# Patient Record
Sex: FEMALE | Race: BLACK OR AFRICAN AMERICAN | URBAN - METROPOLITAN AREA
[De-identification: names, ages, dates, MRNs, and addresses within clinical notes are randomized per-mention and may not be internally consistent; named-entity substitution may affect disease eponyms.]

---

## 2019-02-22 ENCOUNTER — HISTORICAL (OUTPATIENT)
Dept: ADMINISTRATIVE | Facility: HOSPITAL | Age: 34
End: 2019-02-22

## 2019-02-22 LAB
ABS NEUT (OLG): 4.22 X10(3)/MCL (ref 2.1–9.2)
ALBUMIN SERPL-MCNC: 4 GM/DL (ref 3.4–5)
ALBUMIN/GLOB SERPL: 1.1 RATIO (ref 1.1–2)
ALP SERPL-CCNC: 78 UNIT/L (ref 38–126)
ALT SERPL-CCNC: 27 UNIT/L (ref 12–78)
APPEARANCE, UA: CLEAR
AST SERPL-CCNC: 13 UNIT/L (ref 15–37)
BACTERIA #/AREA URNS AUTO: ABNORMAL /HPF
BASOPHILS # BLD AUTO: 0 X10(3)/MCL (ref 0–0.2)
BASOPHILS NFR BLD AUTO: 1 %
BILIRUB SERPL-MCNC: 0.7 MG/DL (ref 0.2–1)
BILIRUB UR QL STRIP: NEGATIVE
BILIRUBIN DIRECT+TOT PNL SERPL-MCNC: 0.1 MG/DL (ref 0–0.5)
BILIRUBIN DIRECT+TOT PNL SERPL-MCNC: 0.6 MG/DL (ref 0–0.8)
BUN SERPL-MCNC: 11 MG/DL (ref 7–18)
CALCIUM SERPL-MCNC: 10.6 MG/DL (ref 8.5–10.1)
CHLORIDE SERPL-SCNC: 110 MMOL/L (ref 98–107)
CHOLEST SERPL-MCNC: 167 MG/DL (ref 0–200)
CHOLEST/HDLC SERPL: 4.9 {RATIO} (ref 0–4)
CO2 SERPL-SCNC: 23 MMOL/L (ref 21–32)
COLOR UR: YELLOW
CREAT SERPL-MCNC: 1.25 MG/DL (ref 0.55–1.02)
EOSINOPHIL # BLD AUTO: 0 X10(3)/MCL (ref 0–0.9)
EOSINOPHIL NFR BLD AUTO: 1 %
ERYTHROCYTE [DISTWIDTH] IN BLOOD BY AUTOMATED COUNT: 14.6 % (ref 11.5–17)
GLOBULIN SER-MCNC: 3.6 GM/DL (ref 2.4–3.5)
GLUCOSE (UA): NEGATIVE
GLUCOSE SERPL-MCNC: 97 MG/DL (ref 74–106)
HCT VFR BLD AUTO: 37.6 % (ref 37–47)
HDLC SERPL-MCNC: 34 MG/DL (ref 35–60)
HGB BLD-MCNC: 12.2 GM/DL (ref 12–16)
HGB UR QL STRIP: NEGATIVE
KETONES UR QL STRIP: ABNORMAL
LDLC SERPL CALC-MCNC: 114 MG/DL (ref 0–129)
LEUKOCYTE ESTERASE UR QL STRIP: ABNORMAL
LYMPHOCYTES # BLD AUTO: 2.1 X10(3)/MCL (ref 0.6–4.6)
LYMPHOCYTES NFR BLD AUTO: 31 %
MCH RBC QN AUTO: 27.9 PG (ref 27–31)
MCHC RBC AUTO-ENTMCNC: 32.4 GM/DL (ref 33–36)
MCV RBC AUTO: 85.8 FL (ref 80–94)
MONOCYTES # BLD AUTO: 0.4 X10(3)/MCL (ref 0.1–1.3)
MONOCYTES NFR BLD AUTO: 6 %
NEUTROPHILS # BLD AUTO: 4.22 X10(3)/MCL (ref 2.1–9.2)
NEUTROPHILS NFR BLD AUTO: 62 %
NITRITE UR QL STRIP.AUTO: NEGATIVE
PH UR STRIP: 6 [PH] (ref 5–9)
PLATELET # BLD AUTO: 280 X10(3)/MCL (ref 130–400)
PMV BLD AUTO: 12.4 FL (ref 9.4–12.4)
POTASSIUM SERPL-SCNC: 3.7 MMOL/L (ref 3.5–5.1)
PROT SERPL-MCNC: 7.6 GM/DL (ref 6.4–8.2)
PROT UR QL STRIP: NEGATIVE
RBC # BLD AUTO: 4.38 X10(6)/MCL (ref 4.2–5.4)
RBC #/AREA URNS HPF: ABNORMAL /[HPF]
SODIUM SERPL-SCNC: 140 MMOL/L (ref 136–145)
SP GR UR STRIP: 1.02 (ref 1–1.03)
SQUAMOUS EPITHELIAL, UA: ABNORMAL
TRIGL SERPL-MCNC: 93 MG/DL (ref 30–150)
UROBILINOGEN UR STRIP-ACNC: 0.2
VLDLC SERPL CALC-MCNC: 19 MG/DL
WBC # SPEC AUTO: 6.8 X10(3)/MCL (ref 4.5–11.5)
WBC #/AREA URNS AUTO: ABNORMAL /HPF (ref 0–3)

## 2019-03-25 ENCOUNTER — HOSPITAL ENCOUNTER (OUTPATIENT)
Dept: MEDSURG UNIT | Facility: HOSPITAL | Age: 34
End: 2019-03-26
Attending: INTERNAL MEDICINE | Admitting: INTERNAL MEDICINE

## 2019-03-25 LAB
ABS NEUT (OLG): 10.56 X10(3)/MCL (ref 2.1–9.2)
ALBUMIN SERPL-MCNC: 2.7 GM/DL (ref 3.4–5)
ALBUMIN/GLOB SERPL: 0.7 RATIO (ref 1.1–2)
ALP SERPL-CCNC: 72 UNIT/L (ref 38–126)
ALT SERPL-CCNC: 49 UNIT/L (ref 12–78)
APPEARANCE, UA: CLEAR
APTT PPP: 36.2 SECOND(S) (ref 24.2–33.9)
AST SERPL-CCNC: 26 UNIT/L (ref 15–37)
B-HCG SERPL QL: NEGATIVE
BACTERIA SPEC CULT: NORMAL /HPF
BASOPHILS # BLD AUTO: 0 X10(3)/MCL (ref 0–0.2)
BASOPHILS NFR BLD AUTO: 0 %
BILIRUB SERPL-MCNC: 0.6 MG/DL (ref 0.2–1)
BILIRUB UR QL STRIP: NEGATIVE
BILIRUBIN DIRECT+TOT PNL SERPL-MCNC: 0.1 MG/DL (ref 0–0.5)
BILIRUBIN DIRECT+TOT PNL SERPL-MCNC: 0.5 MG/DL (ref 0–0.8)
BUN SERPL-MCNC: 9 MG/DL (ref 7–18)
CALCIUM SERPL-MCNC: 9.6 MG/DL (ref 8.5–10.1)
CHLORIDE SERPL-SCNC: 103 MMOL/L (ref 98–107)
CO2 SERPL-SCNC: 30 MMOL/L (ref 21–32)
COLOR UR: YELLOW
CREAT SERPL-MCNC: 1.12 MG/DL (ref 0.55–1.02)
EOSINOPHIL # BLD AUTO: 0.3 X10(3)/MCL (ref 0–0.9)
EOSINOPHIL NFR BLD AUTO: 2 %
ERYTHROCYTE [DISTWIDTH] IN BLOOD BY AUTOMATED COUNT: 15 % (ref 11.5–17)
FERRITIN SERPL-MCNC: 129.1 NG/ML (ref 8–388)
FOLATE SERPL-MCNC: 32 NG/ML (ref 3.1–17.5)
GLOBULIN SER-MCNC: 4 GM/DL (ref 2.4–3.5)
GLUCOSE (UA): NEGATIVE
GLUCOSE SERPL-MCNC: 104 MG/DL (ref 74–106)
HCT VFR BLD AUTO: 24.3 % (ref 37–47)
HGB BLD-MCNC: 7.6 GM/DL (ref 12–16)
HGB UR QL STRIP: NEGATIVE
INR PPP: 1.1 (ref 0–1.3)
IRON SATN MFR SERPL: 18.8 % (ref 20–50)
IRON SERPL-MCNC: 28 MCG/DL (ref 50–175)
KETONES UR QL STRIP: NEGATIVE
LEUKOCYTE ESTERASE UR QL STRIP: NEGATIVE
LYMPHOCYTES # BLD AUTO: 2 X10(3)/MCL (ref 0.6–4.6)
LYMPHOCYTES NFR BLD AUTO: 15 %
MCH RBC QN AUTO: 27.3 PG (ref 27–31)
MCHC RBC AUTO-ENTMCNC: 31.3 GM/DL (ref 33–36)
MCV RBC AUTO: 87.4 FL (ref 80–94)
MONOCYTES # BLD AUTO: 0.5 X10(3)/MCL (ref 0.1–1.3)
MONOCYTES NFR BLD AUTO: 4 %
NEUTROPHILS # BLD AUTO: 10.56 X10(3)/MCL (ref 2.1–9.2)
NEUTROPHILS NFR BLD AUTO: 78 %
NITRITE UR QL STRIP: NEGATIVE
NRBC BLD AUTO-RTO: 0.4 % (ref 0–0.2)
PH UR STRIP: 6.5 [PH] (ref 5–9)
PLATELET # BLD AUTO: 306 X10(3)/MCL (ref 130–400)
PMV BLD AUTO: 10.3 FL (ref 9.4–12.4)
POTASSIUM SERPL-SCNC: 3.4 MMOL/L (ref 3.5–5.1)
PROT SERPL-MCNC: 6.7 GM/DL (ref 6.4–8.2)
PROT UR QL STRIP: NEGATIVE
PROTHROMBIN TIME: 14.8 SECOND(S) (ref 12–14)
RBC # BLD AUTO: 2.78 X10(6)/MCL (ref 4.2–5.4)
RBC #/AREA URNS HPF: NORMAL /[HPF]
SODIUM SERPL-SCNC: 138 MMOL/L (ref 136–145)
SP GR UR STRIP: 1.01 (ref 1–1.03)
SQUAMOUS EPITHELIAL, UA: NORMAL
TIBC SERPL-MCNC: 149 MCG/DL (ref 250–450)
TRANSFERRIN SERPL-MCNC: 119 MG/DL (ref 200–360)
UROBILINOGEN UR STRIP-ACNC: 1
VIT B12 SERPL-MCNC: 1662 PG/ML (ref 193–986)
WBC # SPEC AUTO: 13.5 X10(3)/MCL (ref 4.5–11.5)
WBC #/AREA URNS HPF: NORMAL /[HPF]

## 2019-03-26 LAB
CROSSMATCH INTERPRETATION: NORMAL
GROUP & RH: NORMAL
HCT VFR BLD AUTO: 32.5 % (ref 37–47)
HGB BLD-MCNC: 10.2 GM/DL (ref 12–16)
PRODUCT READY: NORMAL
TRANSFUSION ORDER: NORMAL

## 2022-04-30 NOTE — ED PROVIDER NOTES
Patient:   Marlee Jensen            MRN: [id number]            FIN: [id number]               Age:   33 years     Sex:  Female     :  1985   Associated Diagnoses:   Anemia   Author:   Janusz Corea      Basic Information   Time seen: Date & time 3/25/2019 19:37:00.   History source: Patient.   Arrival mode: Private vehicle, wheelchair.   History limitation: None.   Additional information: Chief Complaint from Nursing Triage Note : Chief Complaint   3/25/2019 19:37 CDT      Chief Complaint           c/o blood clot in left calf leg states that  flew into day and was told that she needs to follow up after her flight she is currently on thinners, had sx on the  she desinse any chest pain or SOB  .      History of Present Illness   The patient presents with lower extremity pain, lower extremity swelling and SOrT note:  Female nonsmoker using birthcontrol implant now post op day 10 from [facility] states developing LLE edema pain and + US for DVT started on Lovenox and travelled to  from [place] presents to ER for follow up reevalaution.  BOB SCHULTE.  The onset was 1  days ago.  The course/duration of symptoms is constant.  Type of injury: none.  Location: Left calf. The character of symptoms is pain and swelling.  The degree at present is moderate.  The exacerbating factor is none.  The relieving factor is none.  Risk factors consist of recent surgery and recent hospitalization.  Prior episodes: none.  Therapy today: none.  Associated symptoms: chest pain and shortness of breath.        Review of Systems   Constitutional symptoms:  No fever, no chills.    Respiratory symptoms:  Shortness of breath, No cough,    Cardiovascular symptoms:  Chest pain, No palpitations,    Gastrointestinal symptoms:  No vomiting, no diarrhea.    Musculoskeletal symptoms:  No back pain,    Neurologic symptoms:  No altered level of consciousness, no weakness.              Additional review  of systems information: All other systems reviewed and otherwise negative.      Health Status   Allergies:    Allergic Reactions (Selected)  No Known Allergies.   Medications:  (Selected)   Prescriptions  Prescribed  ibuprofen 800 mg oral tablet: 800 mg = 1 tab(s), Oral, q8hr, PRN PRN pain, moderate, # 30 tab(s), 0 Refill(s), Pharmacy: [id number], per nurse's notes.   Immunizations: Per nurse's notes.   Menstrual history: Per nurse's notes.      Past Medical/ Family/ Social History   Medical history:    No active or resolved past medical history items have been selected or recorded., Reviewed as documented in chart.   Surgical history:    No active procedure history items have been selected or recorded., Reviewed as documented in chart, abdominal plastic surgery .   Family history:    No family history items have been selected or recorded., Reviewed as documented in chart.   Social history: Reviewed as documented in chart.   Problem list: Per nurse's notes.      Physical Examination               Vital Signs   Vital Signs   3/25/2019 19:37 CDT      Temperature Oral          36.7 DegC                             Temperature Oral (calculated)             98.06 DegF                             Peripheral Pulse Rate     124 bpm  HI                             Respiratory Rate          18 br/min                             Systolic Blood Pressure   98 mmHg                             Diastolic Blood Pressure  73 mmHg  .   General:  Alert, no acute distress, well hydrated, Skin: Normal for ethnicity.    Skin:  Warm, dry, pink, intact.    Head:  Normocephalic.   Neck:  Supple, no tenderness, full range of motion.    Eye:  Pupils are equal, round and reactive to light, extraocular movements are intact, normal conjunctiva.    Ears, nose, mouth and throat:  Oral mucosa moist.   Cardiovascular:  No murmur, Normal peripheral perfusion, Tachycardia, S1, S2.    Respiratory:  Lungs are clear to auscultation, breath  sounds are equal, Respirations: not tachypneic, not labored, not shallow, Retractions: None.    Chest wall:  No tenderness.   Back:  Normal range of motion, Normal alignment, No costovertebral angle tenderness,    Musculoskeletal:  Normal ROM, normal strength, no swelling, no deformity, Lower extremity: Left, calf, tenderness, swelling.    Gastrointestinal:  Soft, Nontender, Non distended, Normal bowel sounds, abdominal incisions , refused occult stool .    Neurological:  Alert and oriented to person, place, time, and situation, No focal neurological deficit observed, normal sensory observed, normal motor observed, normal speech observed, normal coordination observed, Gait: Normal.    Psychiatric:  Cooperative, appropriate mood & affect, normal judgment.       Medical Decision Making   Documents reviewed:  Emergency department nurses' notes.   Orders  Launch Orders   Laboratory:  INR - Protime (Order): Stat collect, 3/25/2019 19:41 CDT, Blood, Lab Collect, Print Label By Order Location, 3/25/2019 19:41 CDT  PTT (Order): Stat collect, 3/25/2019 19:41 CDT, Blood, Lab Collect, Print Label By Order Location, 3/25/2019 19:41 CDT  Beta hCG Qualitative (Order): Stat collect, 3/25/2019 19:41 CDT, Blood, Lab Collect, Print Label By Order Location, 3/25/2019 19:41 CDT  CMP (Order): Stat collect, 3/25/2019 19:39 CDT, Blood, Lab Collect, Print Label By Order Location, 3/25/2019 19:39 CDT  CBC w/ Auto Diff (Order): Now collect, 3/25/2019 19:39 CDT, Blood, Lab Collect, Print Label By Order Location, 3/25/2019 19:39 CDT  Patient Care:  IV Fluids (Order)  Saline Lock Insert (Order): 3/25/2019 19:40 CDT  Pharmacy:  IVF Normal Saline NS Bolus 1000ml (Order): 1,000 mL, 1,000 mL, IV, 1,000 mL, start date 3/25/2019 19:42 CDT  Cardiology:  US NIVA Venous Lower Ext Left (Order): 3/25/2019 19:40 CDT  EKG Adult 12 Lead (Order): 3/25/2019 19:39 CDT, Stat, Once, 3/25/2019 19:39 CDT, -1, -1, 3/25/2019 19:39 CDT, Launch Orders   Radiology:  CT  Chest Lungs W Contrast (Order): Stat, 3/25/2019 20:43 CDT, Pulmonary Embolism, None, Stretcher, Rad Type, Schedule this test  , Launch Orders   Laboratory:  Type and Crossmatch 1st order (Order): 3/25/2019 22:25 CDT, Routine collect, Blood, Lab Collect, Leukopoor RBC, Surgery, 2, 3/25/2019, Print Label By Order Location  Type and Rh (Order Processing)  Antibody Screen for transfusion  (Indirect Elmer) (Order Processing)  Xmatch (Order Processing)  Red Blood Cells (Order Processing)  , Launch Orders   Laboratory:  Iron Profile (Order): Stat collect, 3/25/2019 23:33 CDT, Blood, Lab Collect, Print Label By Order Location, 3/25/2019 23:33 CDT  .       Impression and Plan   Diagnosis   Anemia (LRT58-DR D64.9)   Plan   Condition: Stable.    Disposition: Admit time  3/25/2019 23:33:00, Admit to Inpatient Unit.    Counseled: Patient, Regarding diagnosis, Regarding diagnostic results, Regarding treatment plan, Patient indicated understanding of instructions.    Orders: Launch Orders   Admit/Transfer/Discharge:  Admit as Inpatient (Order): 3/25/2019 23:33 CDT, Medical Unit Maurizio GUPTA, Richie BELLO, No  .       Addendum      Teaching-Supervisory Addendum-Brief   I participated in the following activities of this patients care: the medical history, medical decision making.   I personally performed: supervision of the patient's care, the medical decision making.   The case was discussed with: the nurse practitioner.   Evaluation and management service: I agree with the evaluation and management decisions made in this patient's care.   Results interpretation: I agree with the study interpretation in this patient's care.   Notes: I had face-to-face time with this patient, and I discussed the MDM and reviewed the results with the NP.     Briefly, this is a 34 y/o F 10 days s/p cosmetic surgery int he [place] who presented for evaluation of lower extremity swelling as she was diagnosed with a DVT prior to traveling back  to the [place]. She was started on lovenox prior to leaving [place]. ED work up w/o acute DVT and CT thorax w/o PE. Labs notable for significant anemia - will not allow for SILVIA to r/o GI bleeding, but denies hematochezia or melena. Will admit for transfusion, reassessment of labs in AM.     Kayce Black MD.

## 2022-04-30 NOTE — H&P
Patient:   Marlee Jensen            MRN: [id number]            FIN: [id number]               Age:   33 years     Sex:  Female     :  1985   Associated Diagnoses:   None   Author:   Richie Steven MD      Basic Information   Time Seen:  Date & Time 3/26/2019 01:13:00.    Source of history:  Self.    Referral source:  Emergency department.    History limitation:  None.    Advance directive:  Full code.       Chief Complaint   LLE swelling      History of Present Illness   Patient is a 33-year-old female presented to the ER complaining of left swelling.  She reported that she underwent an abdominoplasty approximately 2 weeks ago in the [place], and was sent home with one indwelling abdominal drain that is being managed by her plastic surgeon in [place].  She reported during the perioperative period in the [facility] she was told she may have had a very small DVT in her lower extremity, but she was only placed on daily injection of 40 mg of Lovenox for DVT prophylaxis.  When she noted the swelling this evening she was concerned that maybe her clot had come back so presented to the ER, but on arrival on ultrasound overload which showed no evidence of any DVTs.  She did however laboratory work have significant anemia with a hemoglobin tonight of 7.6, prior month ago was above 12.  She reported during the postoperative period they reported her hemoglobin did drop to about 9.4, and that she has had significant anemia in the past secondary to iron deficiency and is supposed to be taking omental on but this makes her constipated.  She is being admitted to the hospitalist service for blood transfusion, and he continued on her prophylactic Lovenox.      Review of Systems   Except as documented, all other systems reviewed and negative      Health Status   Allergies:    Allergic Reactions (Selected)  No Known Allergies,    Allergies (1) Active Reaction  No Known Allergies None  Documented   Current medications:  (Selected)   Inpatient Medications  Ordered  IVF Normal Saline NS Bolus 1000ml 1,000 mL: 1,000 mL, 1,000 mL, IV, 1,000 mL, start date 03/25/19 19:42:00 CDT  Lovenox: 40 mg, form: Injection, Subcutaneous, Daily, first dose 03/27/19 9:00:00 CDT  Protonix: 40 mg, Oral, Daily, first dose 03/26/19 6:00:00 CDT  Prescriptions  Prescribed  ibuprofen 800 mg oral tablet: 800 mg = 1 tab(s), Oral, q8hr, PRN PRN pain, moderate, # 30 tab(s), 0 Refill(s), Pharmacy: [id number]  Documented Medications  Documented  Lovenox 40 mg/0.4 mL subcutaneous solution: 40 mg = 0.4 mL, Subcutaneous, Daily, # 2.8 mL, 0 Refill(s)      Histories     Past Medical History:Morbid obesity and iron deficiency anemia   Past Surgical History:Abdominoplasty 2 weeks ago   Family History: Negative   Social History:  No alcohol, tobacco or illicit drug use.       Physical Examination      Vital Signs (last 24 hrs)_____  Last Charted___________  Temp Oral     36.7 DegC  (MAR 25 19:37)  Heart Rate Peripheral   H 110bpm  (MAR 26 00:55)  Resp Rate         18 br/min  (MAR 26 00:55)  SBP      134 mmHg  (MAR 26 00:55)  DBP      75 mmHg  (MAR 26 00:55)  SpO2      100 %  (MAR 26 00:55)   General:  Alert and oriented, No acute distress.    Cognition and Speech:  Oriented, Speech clear and coherent.    HENT:  Normocephalic, Normal hearing, Oral mucosa is moist.    Eye:  Pupils are equal, round and reactive to light, Normal conjunctiva.    Neck:  Supple, No carotid bruit, No jugular venous distention.    Respiratory:  Lungs are clear to auscultation, Respirations are non-labored, Breath sounds are equal.    Cardiovascular:  Normal rate, Regular rhythm, No murmur, No edema.    Gastrointestinal:  Soft, Non-tender, Non-distended, Normal bowel sounds, Abdominal incisional scar is healing well, with no erythema or tenderness. there is a drain in place draining serous fluid.  .    Integumentary:  Warm, Dry.    Musculoskeletal:   Normal strength, No tenderness, No swelling.    Neurologic:  Alert, Oriented, Normal sensory, No focal deficits.    Psychiatric:  Cooperative, Appropriate mood & affect, Normal judgment.       Review / Management   Laboratory Results   Today's Lab Results : PowerNote Discrete Results   3/25/2019 22:02 CDT      UA Appear                 CLEAR                             UA Color                  YELLOW                             UA Spec Grav              1.006                             UA Bili                   Negative                             UA pH                     6.5                             UA Urobilinogen           1.0                             UA Blood                  Negative                             UA Glucose                Negative                             UA Ketones                Negative                             UA Protein                Negative                             UA Nitrite                Negative                             UA Leuk Est               Negative                             UA WBC                    NONE SEEN                             UA RBC                    NONE SEEN                             UA Bacteria               NONE SEEN /HPF                             UA Squam Epithelial       NONE SEEN    3/25/2019 20:42 CDT      WBC                       13.5 x10(3)/mcL  HI                             RBC                       2.78 x10(6)/mcL  LOW                             Hgb                       7.6 gm/dL  LOW                             Hct                       24.3 %  LOW                             Platelet                  306 x10(3)/mcL                             MCV                       87.4 fL                             MCH                       27.3 pg                             MCHC                      31.3 gm/dL  LOW                             RDW                       15.0 %                             MPV                        10.3 fL                             Abs Neut                  10.56 x10(3)/mcL  HI                             Neutro Auto               78 %  NA                             Lymph Auto                15 %  NA                             Mono Auto                 4 %  NA                             Eos Auto                  2 %  NA                             Abs Eos                   0.3 x10(3)/mcL                             Basophil Auto             0 %  NA                             Abs Neutro                10.56 x10(3)/mcL  HI                             Abs Lymph                 2.0 x10(3)/mcL                             Abs Mono                  0.5 x10(3)/mcL                             Abs Baso                  0.0 x10(3)/mcL                             NRBC%                     0.4 %  HI                             PT                        14.8 second(s)  HI                             INR                       1.1                             PTT                       36.2 second(s)  HI                             Sodium Lvl                138 mmol/L                             Potassium Lvl             3.4 mmol/L  LOW                             Chloride                  103 mmol/L                             CO2                       30.0 mmol/L                             Calcium Lvl               9.6 mg/dL                             Glucose Lvl               104 mg/dL                             BUN                       9.0 mg/dL                             Creatinine                1.12 mg/dL  HI                             eGFR-AA                   >60 mL/min/1.73 m2  NA                             eGFR-ANIA                  60 mL/min/1.73 m2  NA                             Bili Total                0.6 mg/dL                             Bili Direct               0.10 mg/dL                             Bili Indirect             0.50 mg/dL                             AST                       26  unit/L                             ALT                       49 unit/L                             Alk Phos                  72 unit/L                             Total Protein             6.7 gm/dL                             Albumin Lvl               2.70 gm/dL  LOW                             Globulin                  4.00 gm/dL  HI                             A/G Ratio                 0.7 ratio  LOW                             Beta hCG Ql               Negative        Radiology results   Rad Results (ST)   Accession: [id number]  Order: CT Thorax W Contrast  Report Dt/Tm: 03/25/2019 23:06  Report:   CTA of the chest with coronal and sagittal MIPS images     Clinical history is pulmonary embolism.     Mediastinum reveals no significant adenopathy. The thoracic aorta  appears intact. The visualized portions of the upper abdomen appear  normal. No infiltrates are seen. No peripheral nodules are noted.  There is a questionable appearance of the left rectus abdominis muscle  however this does not appear acute this most likely represents  previous injury. There are no filling defects seen within the  pulmonary arteries to indicate an embolus. There is edema in the  subcutaneous tissue posteriorly and I cannot rule out anasarca     IMPRESSION: No pulmonary embolus is identified.     Changes suspicious for anasarca.          Impression and Plan   Postoperative anemia  History of iron deficiency anemia  2 weeks status post abdominoplasty, with indwelling abdominal drain  History of reported very small DVT with no further details but is now resolved on ultrasound    PLAN:   - Iron profile, 12, folate, occult blood   -Transfused 2 units of pack red blood cells with repeat H&H in the mean.  - If hemoglobin is above 9 with discharge home  - Patient unit per minute with hypertension and [place]  -Resume home iron appointment twice a day    ISallie FNP-C have reviewed and disussed this case with   Manish.      Code status:full  DVT prophylaxis: lovenox  Admission time 72 minutes.       Professional Services   I, Richie Steven MD, assumed care of this patient at the time of this addendum and assisted with the composition of the above assesment and plan. For this patient encounter, I reviewed the NP or PA documentation, treatment plan, and medical decision making; and I had face-to-face time with this patient.  Labs and imaging were reviewed and I agree with history, physical and medical decision making as detailed above.      33 F 2 weeks s/p liposuction and abdominoplasty in [facility], f/u planned with plastic surgeon in [place]. 1 abd drain in place serous fluid min blood. c/o leg swelling but US of left leg tonight showed no DVT.  She is on prophylactic dose lovenox for 10 more days per her surgeon in .  She reports she may have had a very small DVT in her leg noted on imaging in  but this appears to not be present on US this evening.  Found to have significant anemia, likely post-op anemia.  Transfusing.  Refused SILVIA, stool occult blood ordered. Has close follow-up. If Hb >9 after 2 units she may be discharged with f/u with her surgeon as scheduled.  Given rx for iron and colace for hx of SIXTO.

## 2025-04-30 DIAGNOSIS — G56.02 ACUTE CARPAL TUNNEL SYNDROME OF LEFT WRIST: Primary | ICD-10-CM

## 2025-08-11 ENCOUNTER — HOSPITAL ENCOUNTER (OUTPATIENT)
Dept: RADIOLOGY | Facility: HOSPITAL | Age: 40
Discharge: HOME | End: 2025-08-11
Attending: STUDENT IN AN ORGANIZED HEALTH CARE EDUCATION/TRAINING PROGRAM
Payer: MEDICAID

## 2025-08-11 ENCOUNTER — OFFICE VISIT (OUTPATIENT)
Dept: ORTHOPEDICS | Facility: CLINIC | Age: 40
End: 2025-08-11
Payer: MEDICAID

## 2025-08-11 ENCOUNTER — TELEPHONE (OUTPATIENT)
Dept: NEUROLOGY | Facility: CLINIC | Age: 40
End: 2025-08-11
Payer: MEDICAID

## 2025-08-11 VITALS — BODY MASS INDEX: 34.86 KG/M2 | HEIGHT: 68 IN | WEIGHT: 230 LBS

## 2025-08-11 DIAGNOSIS — M25.522 LEFT ELBOW PAIN: ICD-10-CM

## 2025-08-11 DIAGNOSIS — G56.32 RADIAL NERVE PALSY, LEFT: Primary | ICD-10-CM

## 2025-08-11 DIAGNOSIS — M25.512 ACUTE PAIN OF LEFT SHOULDER: ICD-10-CM

## 2025-08-11 DIAGNOSIS — G56.12 MEDIAN NERVE PALSY, LEFT: ICD-10-CM

## 2025-08-11 DIAGNOSIS — M89.8X2 PAIN OF LEFT HUMERUS: ICD-10-CM

## 2025-08-11 DIAGNOSIS — R29.898 HAND WEAKNESS: ICD-10-CM

## 2025-08-11 DIAGNOSIS — M79.642 LEFT HAND PAIN: ICD-10-CM

## 2025-08-11 DIAGNOSIS — M79.642 LEFT HAND PAIN: Primary | ICD-10-CM

## 2025-08-11 PROCEDURE — 73080 X-RAY EXAM OF ELBOW: CPT | Mod: 26,LT,, | Performed by: RADIOLOGY

## 2025-08-11 PROCEDURE — 99999 PR PBB SHADOW E&M-EST. PATIENT-LVL III: CPT | Mod: PBBFAC,,, | Performed by: STUDENT IN AN ORGANIZED HEALTH CARE EDUCATION/TRAINING PROGRAM

## 2025-08-11 PROCEDURE — 1160F RVW MEDS BY RX/DR IN RCRD: CPT | Mod: CPTII,,, | Performed by: STUDENT IN AN ORGANIZED HEALTH CARE EDUCATION/TRAINING PROGRAM

## 2025-08-11 PROCEDURE — 73060 X-RAY EXAM OF HUMERUS: CPT | Mod: 26,LT,, | Performed by: RADIOLOGY

## 2025-08-11 PROCEDURE — 73060 X-RAY EXAM OF HUMERUS: CPT | Mod: TC,LT

## 2025-08-11 PROCEDURE — 73030 X-RAY EXAM OF SHOULDER: CPT | Mod: TC,LT

## 2025-08-11 PROCEDURE — 73080 X-RAY EXAM OF ELBOW: CPT | Mod: TC,LT

## 2025-08-11 PROCEDURE — 3008F BODY MASS INDEX DOCD: CPT | Mod: CPTII,,, | Performed by: STUDENT IN AN ORGANIZED HEALTH CARE EDUCATION/TRAINING PROGRAM

## 2025-08-11 PROCEDURE — 73030 X-RAY EXAM OF SHOULDER: CPT | Mod: 26,LT,, | Performed by: RADIOLOGY

## 2025-08-11 PROCEDURE — 1159F MED LIST DOCD IN RCRD: CPT | Mod: CPTII,,, | Performed by: STUDENT IN AN ORGANIZED HEALTH CARE EDUCATION/TRAINING PROGRAM

## 2025-08-11 PROCEDURE — 99213 OFFICE O/P EST LOW 20 MIN: CPT | Mod: PBBFAC,25 | Performed by: STUDENT IN AN ORGANIZED HEALTH CARE EDUCATION/TRAINING PROGRAM

## 2025-08-11 PROCEDURE — 99204 OFFICE O/P NEW MOD 45 MIN: CPT | Mod: S$PBB,,, | Performed by: STUDENT IN AN ORGANIZED HEALTH CARE EDUCATION/TRAINING PROGRAM

## 2025-08-11 RX ORDER — ALPRAZOLAM 0.5 MG/1
0.5 TABLET ORAL NIGHTLY PRN
COMMUNITY

## 2025-08-11 RX ORDER — GABAPENTIN 300 MG/1
300 CAPSULE ORAL
COMMUNITY
Start: 2025-03-12

## 2025-08-11 RX ORDER — IBUPROFEN 800 MG/1
800 TABLET, FILM COATED ORAL EVERY 8 HOURS PRN
COMMUNITY
Start: 2025-06-06

## 2025-08-11 RX ORDER — NORELGESTROMIN AND ETHINYL ESTRADIOL 35; 150 UG/D; UG/D
PATCH TRANSDERMAL
COMMUNITY
Start: 2025-07-31

## 2025-08-11 RX ORDER — METHOCARBAMOL 750 MG/1
750 TABLET, FILM COATED ORAL EVERY 8 HOURS PRN
COMMUNITY
Start: 2025-03-12

## 2025-08-11 RX ORDER — ONDANSETRON 8 MG/1
8 TABLET, ORALLY DISINTEGRATING ORAL EVERY 8 HOURS PRN
COMMUNITY
Start: 2025-07-15

## 2025-08-11 RX ORDER — HYDROCHLOROTHIAZIDE 25 MG/1
25 TABLET ORAL DAILY PRN
COMMUNITY
Start: 2025-07-31

## 2025-08-11 RX ORDER — HYDROCODONE BITARTRATE AND ACETAMINOPHEN 10; 325 MG/1; MG/1
1 TABLET ORAL EVERY 8 HOURS PRN
COMMUNITY

## 2025-08-27 ENCOUNTER — OFFICE VISIT (OUTPATIENT)
Dept: PHYSICAL MEDICINE AND REHAB | Facility: CLINIC | Age: 40
End: 2025-08-27
Payer: MEDICAID

## 2025-08-27 VITALS — WEIGHT: 229.94 LBS | BODY MASS INDEX: 34.85 KG/M2 | HEIGHT: 68 IN | RESPIRATION RATE: 13 BRPM

## 2025-08-27 DIAGNOSIS — G56.32 RADIAL NERVE PALSY, LEFT: ICD-10-CM

## 2025-08-27 DIAGNOSIS — M62.838 MUSCLE SPASTICITY: ICD-10-CM

## 2025-08-27 DIAGNOSIS — G56.03 BILATERAL CARPAL TUNNEL SYNDROME: ICD-10-CM

## 2025-08-27 PROBLEM — G83.9: Status: ACTIVE | Noted: 2025-08-27

## 2025-08-27 PROBLEM — Z85.830 HISTORY OF SARCOMA OF BONE: Status: ACTIVE | Noted: 2025-08-27

## 2025-08-27 PROBLEM — S42.352K: Status: ACTIVE | Noted: 2024-06-28

## 2025-08-27 PROCEDURE — 95911 NRV CNDJ TEST 9-10 STUDIES: CPT | Mod: PBBFAC | Performed by: PHYSICAL MEDICINE & REHABILITATION

## 2025-08-27 PROCEDURE — 99499 UNLISTED E&M SERVICE: CPT | Mod: S$PBB,,, | Performed by: PHYSICAL MEDICINE & REHABILITATION

## 2025-08-27 PROCEDURE — 95886 MUSC TEST DONE W/N TEST COMP: CPT | Mod: PBBFAC | Performed by: PHYSICAL MEDICINE & REHABILITATION

## 2025-08-27 PROCEDURE — 95911 NRV CNDJ TEST 9-10 STUDIES: CPT | Mod: 26,S$PBB,, | Performed by: PHYSICAL MEDICINE & REHABILITATION

## 2025-08-27 PROCEDURE — 99213 OFFICE O/P EST LOW 20 MIN: CPT | Mod: PBBFAC | Performed by: PHYSICAL MEDICINE & REHABILITATION

## 2025-08-27 PROCEDURE — 95886 MUSC TEST DONE W/N TEST COMP: CPT | Mod: 26,S$PBB,, | Performed by: PHYSICAL MEDICINE & REHABILITATION

## 2025-08-27 PROCEDURE — 99999 PR PBB SHADOW E&M-EST. PATIENT-LVL III: CPT | Mod: PBBFAC,,, | Performed by: PHYSICAL MEDICINE & REHABILITATION

## 2025-09-04 ENCOUNTER — TELEPHONE (OUTPATIENT)
Dept: SPORTS MEDICINE | Facility: CLINIC | Age: 40
End: 2025-09-04
Payer: MEDICAID